# Patient Record
Sex: FEMALE | Race: WHITE | NOT HISPANIC OR LATINO | Employment: OTHER | URBAN - METROPOLITAN AREA
[De-identification: names, ages, dates, MRNs, and addresses within clinical notes are randomized per-mention and may not be internally consistent; named-entity substitution may affect disease eponyms.]

---

## 2025-05-12 ENCOUNTER — HOSPITAL ENCOUNTER (OUTPATIENT)
Dept: RADIOLOGY | Facility: HOSPITAL | Age: 65
Discharge: HOME | End: 2025-05-12
Payer: COMMERCIAL

## 2025-05-12 ENCOUNTER — APPOINTMENT (OUTPATIENT)
Dept: ORTHOPEDIC SURGERY | Facility: CLINIC | Age: 65
End: 2025-05-12
Payer: COMMERCIAL

## 2025-05-12 DIAGNOSIS — M25.531 RIGHT WRIST PAIN: ICD-10-CM

## 2025-05-12 PROCEDURE — 73110 X-RAY EXAM OF WRIST: CPT | Mod: RIGHT SIDE | Performed by: RADIOLOGY

## 2025-05-12 PROCEDURE — 99203 OFFICE O/P NEW LOW 30 MIN: CPT | Performed by: ORTHOPAEDIC SURGERY

## 2025-05-12 PROCEDURE — 73110 X-RAY EXAM OF WRIST: CPT | Mod: RT

## 2025-05-12 ASSESSMENT — PAIN SCALES - GENERAL: PAINLEVEL_OUTOF10: 4

## 2025-05-12 ASSESSMENT — PAIN - FUNCTIONAL ASSESSMENT: PAIN_FUNCTIONAL_ASSESSMENT: 0-10

## 2025-05-13 NOTE — PROGRESS NOTES
History of Present Illness:  Chief Complaint   Patient presents with    Right Wrist - Pain       Patient presents today for evaluation of right ulnar-sided wrist pain that has been present for about 6 months.  She does not recall any specific injury, but does report heavy use of her hands/wrists..  She describes an intermittent aching and throbbing sensation.  Pain is also sometimes sharp and seems to be affecting her .  No numbness or tingling.  Pain sometimes radiates from her wrist distally and proximally.  She has been using a wrist brace that does provide some degree of relief.    Medical History[1]    Medication Documentation Review Audit       Reviewed by Elvia Ha CMA (Medical Assistant) on 05/12/25 at 1450      Medication Order Taking? Sig Documenting Provider Last Dose Status            No Medications to Display                                   RX Allergies[2]    Social History     Socioeconomic History    Marital status:      Spouse name: Not on file    Number of children: Not on file    Years of education: Not on file    Highest education level: Not on file   Occupational History    Not on file   Tobacco Use    Smoking status: Every Day     Types: Cigarettes    Smokeless tobacco: Never   Substance and Sexual Activity    Alcohol use: Not Currently    Drug use: Never    Sexual activity: Defer   Other Topics Concern    Not on file   Social History Narrative    Not on file     Social Drivers of Health     Financial Resource Strain: Not on file   Food Insecurity: Not on file   Transportation Needs: Not on file   Physical Activity: Not on file   Stress: Not on file   Social Connections: Not on file   Intimate Partner Violence: Not on file   Housing Stability: Not on file       Surgical History[3]     Review of Systems   GENERAL: Negative for malaise, significant weight loss, fever  MUSCULOSKELETAL: see HPI  NEURO:  Negative     Physical Examination  Constitutional: Appears well-developed and  well-nourished.  Head: Normocephalic and atraumatic.  Eyes: EOMI grossly  Cardiovascular: Intact distal pulses.   Respiratory: Effort normal. No respiratory distress.  Neurologic: Alert and oriented to person, place, and time.  Skin: Skin is warm and dry.  Hematologic / Lymphatic: No lymphedema, lymphangitis.  Psychiatric: normal mood and affect. Behavior is normal.   Musculoskeletal:  Right wrist: 60/60 degrees flexion/extension.  80/80 degrees pronation/supination.  Negative DAVID/DRUJ.  Tenderness in region of TFCC.  Negative scaphoid shift.  Negative Synergy test.  No specific tenderness about ECU extending proximally.  0 cm DPC.  Sensation grossly intact throughout distally.  2+ radial pulse.    Radiographs: Right wrist radiographs ordered and available for my review/interpretation: No fracture/dislocation.  Joint spaces relatively maintained.     Assessment:  Patient with right ulnar-sided wrist pain.  Primarily in region of TFCC.     Plan:  We discussed likely diagnosis as well as potential treatment options including continued various nonoperative modalities as well as potential role of surgical intervention if persistent symptoms.  After thoroughly reviewing patient would like to proceed with corticosteroid injection to see if this provides her with some relief.  She will follow-up if persistent/recurrent symptoms.                [1]   Past Medical History:  Diagnosis Date    Candidiasis of skin and nail 11/25/2013    Candidal intertrigo    Encounter for other specified special examinations 04/01/2019    Encounter for screening for tobacco use    Impacted cerumen, bilateral 04/01/2019    Bilateral impacted cerumen    Other general symptoms and signs 01/10/2020    Flu-like symptoms    Personal history of other diseases of the respiratory system 11/25/2013    History of acute sinusitis    Personal history of other diseases of the respiratory system 01/10/2020    History of acute bronchitis   [2]    Allergies  Allergen Reactions    Thiopental Sodium Unknown   [3] History reviewed. No pertinent surgical history.